# Patient Record
Sex: FEMALE | Race: WHITE | NOT HISPANIC OR LATINO | URBAN - METROPOLITAN AREA
[De-identification: names, ages, dates, MRNs, and addresses within clinical notes are randomized per-mention and may not be internally consistent; named-entity substitution may affect disease eponyms.]

---

## 2019-03-28 ENCOUNTER — APPOINTMENT (OUTPATIENT)
Age: 49
Setting detail: DERMATOLOGY
End: 2019-03-28

## 2019-03-28 VITALS
DIASTOLIC BLOOD PRESSURE: 89 MMHG | HEART RATE: 76 BPM | SYSTOLIC BLOOD PRESSURE: 146 MMHG | HEIGHT: 64 IN | WEIGHT: 175 LBS

## 2019-03-28 DIAGNOSIS — L71.8 OTHER ROSACEA: ICD-10-CM

## 2019-03-28 DIAGNOSIS — F17.200 NICOTINE DEPENDENCE, UNSPECIFIED, UNCOMPLICATED: ICD-10-CM

## 2019-03-28 DIAGNOSIS — L93.1 SUBACUTE CUTANEOUS LUPUS ERYTHEMATOSUS: ICD-10-CM

## 2019-03-28 PROBLEM — L30.9 DERMATITIS, UNSPECIFIED: Status: ACTIVE | Noted: 2019-03-28

## 2019-03-28 PROCEDURE — OTHER PRESCRIPTION: OTHER

## 2019-03-28 PROCEDURE — 11104 PUNCH BX SKIN SINGLE LESION: CPT

## 2019-03-28 PROCEDURE — OTHER BIOPSY BY PUNCH METHOD: OTHER

## 2019-03-28 PROCEDURE — OTHER PATIENT SPECIFIC COUNSELING: OTHER

## 2019-03-28 PROCEDURE — OTHER COUNSELING: OTHER

## 2019-03-28 PROCEDURE — OTHER MIPS QUALITY: OTHER

## 2019-03-28 PROCEDURE — 11105 PUNCH BX SKIN EA SEP/ADDL: CPT

## 2019-03-28 PROCEDURE — 99203 OFFICE O/P NEW LOW 30 MIN: CPT | Mod: 25

## 2019-03-28 RX ORDER — TRIAMCINOLONE ACETONIDE 1 MG/G
CREAM TOPICAL BID
Qty: 1 | Refills: 2 | Status: ERX | COMMUNITY
Start: 2019-03-28

## 2019-03-28 ASSESSMENT — LOCATION DETAILED DESCRIPTION DERM
LOCATION DETAILED: LEFT INFERIOR MEDIAL MALAR CHEEK
LOCATION DETAILED: RIGHT LATERAL SUPERIOR CHEST
LOCATION DETAILED: RIGHT MEDIAL SUPERIOR CHEST
LOCATION DETAILED: UPPER STERNUM
LOCATION DETAILED: RIGHT CENTRAL MALAR CHEEK
LOCATION DETAILED: SUPERIOR THORACIC SPINE

## 2019-03-28 ASSESSMENT — LOCATION ZONE DERM
LOCATION ZONE: FACE
LOCATION ZONE: TRUNK

## 2019-03-28 ASSESSMENT — LOCATION SIMPLE DESCRIPTION DERM
LOCATION SIMPLE: LEFT CHEEK
LOCATION SIMPLE: RIGHT CHEEK
LOCATION SIMPLE: UPPER BACK
LOCATION SIMPLE: CHEST

## 2019-03-28 ASSESSMENT — SEVERITY ASSESSMENT
SEVERITY: MODERATE
SEVERITY: MILD TO MODERATE

## 2019-03-28 ASSESSMENT — PAIN INTENSITY VAS: HOW INTENSE IS YOUR PAIN 0 BEING NO PAIN, 10 BEING THE MOST SEVERE PAIN POSSIBLE?: NO PAIN

## 2019-03-28 NOTE — PROCEDURE: PATIENT SPECIFIC COUNSELING
Explained etiology in detail with patient. Advised to avoid rosacea triggers including extreme heat or cold, stress, intake of alcohol, spicy foods, and caffeine. Use of topical or oral antibiotics may be used to aid in acne like bumps caused by rosacea, however facial redness and broken vessels requires cosmetic laser therapy treatment. The patient elects for no treatment at this time.
Explained the etiology of the condition in detail. Discussed treatment options including a topical steroid medications twice daily. Explained to the patient that with the previously prescribed prednisone that she is currently taking for her lupus flares should help with the rash as well. Explained to the patient in order to get a definitive diagnosis recommended a punch biopsy. The patient has expressed understanding and elects for a punch biopsy at this time.
Detail Level: Simple

## 2019-03-28 NOTE — HPI: RASH
What Type Of Note Output Would You Prefer (Optional)?: Standard Output
How Severe Is Your Rash?: mild
Is This A New Presentation, Or A Follow-Up?: Rash
Additional History: The patient had a surgery on December 27, 2018 and noticed the rash started a few days later. The patient started taking arava for rheumatoid arthritis and lupus’s few weeks prior to surgery, the patient discontinued medication three days ago.
Additional History: The patient is concerned about the broken capillaries on her face. Pain 0/10.

## 2019-03-28 NOTE — PROCEDURE: MIPS QUALITY
Quality 131: Pain Assessment And Follow-Up: Pain assessment using a standardized tool is documented as negative, no follow-up plan required
Detail Level: Zone
Quality 226: Preventive Care And Screening: Tobacco Use: Screening And Cessation Intervention: Patient screened for tobacco use, is a smoker AND received Cessation Counseling
Quality 265: Biopsy Follow-Up: Biopsy results reviewed, communicated, tracked, and documented

## 2019-03-28 NOTE — PROCEDURE: BIOPSY BY PUNCH METHOD
Hemostasis: None
Punch Size In Mm: 4
Notification Instructions: Patient will be notified of biopsy results. However, patient instructed to call the office if not contacted within 2 weeks.
Dressing: bandage
Wound Care: Petrolatum
Detail Level: Detailed
Anesthesia Type: 1% lidocaine without epinephrine and a 1:10 solution of 8.4% sodium bicarbonate
Patient Will Remove Sutures At Home?: No
Consent: Written consent was obtained and risks were reviewed including but not limited to scarring, infection, bleeding, scabbing, incomplete removal, nerve damage and allergy to anesthesia.
Biopsy Type: H and E
X Size Of Lesion In Cm (Optional): 0
Suture Removal: 14 days
Anesthesia Volume In Cc (Will Not Render If 0): 0.5
Epidermal Sutures: 4-0 Nylon
Post-Care Instructions: I reviewed with the patient in detail post-care instructions. Patient is to keep the biopsy site dry overnight, and then apply bacitracin twice daily until healed. Patient may apply hydrogen peroxide soaks to remove any crusting.
Billing Type: Third-Party Bill
Home Suture Removal Text: Patient was provided a home suture removal kit and will remove their sutures at home.  If they have any questions or difficulties they will call the office.
Biopsy Type: DIF
Was A Bandage Applied: Yes

## 2019-04-11 ENCOUNTER — APPOINTMENT (OUTPATIENT)
Age: 49
Setting detail: DERMATOLOGY
End: 2019-04-14

## 2019-04-11 VITALS
HEART RATE: 75 BPM | DIASTOLIC BLOOD PRESSURE: 84 MMHG | HEIGHT: 65 IN | WEIGHT: 170 LBS | SYSTOLIC BLOOD PRESSURE: 142 MMHG

## 2019-04-11 DIAGNOSIS — L27.0 GENERALIZED SKIN ERUPTION DUE TO DRUGS AND MEDICAMENTS TAKEN INTERNALLY: ICD-10-CM

## 2019-04-11 DIAGNOSIS — F17.200 NICOTINE DEPENDENCE, UNSPECIFIED, UNCOMPLICATED: ICD-10-CM

## 2019-04-11 PROCEDURE — OTHER PATIENT SPECIFIC COUNSELING: OTHER

## 2019-04-11 PROCEDURE — 99213 OFFICE O/P EST LOW 20 MIN: CPT

## 2019-04-11 PROCEDURE — OTHER MIPS QUALITY: OTHER

## 2019-04-11 PROCEDURE — OTHER COUNSELING: OTHER

## 2019-04-11 PROCEDURE — OTHER PATHOLOGY DISCUSSION: OTHER

## 2019-04-11 ASSESSMENT — PAIN INTENSITY VAS: HOW INTENSE IS YOUR PAIN 0 BEING NO PAIN, 10 BEING THE MOST SEVERE PAIN POSSIBLE?: NO PAIN

## 2019-04-11 ASSESSMENT — LOCATION ZONE DERM: LOCATION ZONE: TRUNK

## 2019-04-11 ASSESSMENT — LOCATION DETAILED DESCRIPTION DERM
LOCATION DETAILED: MIDDLE STERNUM
LOCATION DETAILED: SUPERIOR THORACIC SPINE

## 2019-04-11 ASSESSMENT — LOCATION SIMPLE DESCRIPTION DERM
LOCATION SIMPLE: UPPER BACK
LOCATION SIMPLE: CHEST

## 2019-04-11 ASSESSMENT — SEVERITY ASSESSMENT: SEVERITY: MILD

## 2019-04-11 NOTE — PROCEDURE: PATIENT SPECIFIC COUNSELING
Detail Level: Simple
Reassured the patient that condition has improved since her previous visit. Discussed pathology results with the patient in detail including that pathology indicates condition is likely due to medication reaction. Reassured the patient that condition is resolving with treatment regimen and discontinuation of most recently started medication. Instructed the patient to continue use of triamcinolone 0.1% cream twice daily as needed for itch. The patient will follow up as needed.

## 2019-04-11 NOTE — PROCEDURE: MIPS QUALITY
Quality 226: Preventive Care And Screening: Tobacco Use: Screening And Cessation Intervention: Patient screened for tobacco use, is a smoker AND received Cessation Counseling
Detail Level: Zone
Quality 131: Pain Assessment And Follow-Up: Pain assessment using a standardized tool is documented as negative, no follow-up plan required

## 2019-08-01 ENCOUNTER — APPOINTMENT (OUTPATIENT)
Dept: URBAN - METROPOLITAN AREA CLINIC 286 | Age: 49
Setting detail: DERMATOLOGY
End: 2019-08-04

## 2019-08-01 VITALS
HEIGHT: 64 IN | WEIGHT: 170 LBS | DIASTOLIC BLOOD PRESSURE: 78 MMHG | SYSTOLIC BLOOD PRESSURE: 135 MMHG | HEART RATE: 73 BPM

## 2019-08-01 DIAGNOSIS — L93.1 SUBACUTE CUTANEOUS LUPUS ERYTHEMATOSUS: ICD-10-CM

## 2019-08-01 PROBLEM — L30.9 DERMATITIS, UNSPECIFIED: Status: ACTIVE | Noted: 2019-08-01

## 2019-08-01 PROCEDURE — OTHER PATIENT SPECIFIC COUNSELING: OTHER

## 2019-08-01 PROCEDURE — 99213 OFFICE O/P EST LOW 20 MIN: CPT

## 2019-08-01 PROCEDURE — OTHER PRESCRIPTION: OTHER

## 2019-08-01 PROCEDURE — OTHER COUNSELING: OTHER

## 2019-08-01 PROCEDURE — OTHER MIPS QUALITY: OTHER

## 2019-08-01 RX ORDER — FLUOCINONIDE 0.5 MG/G
CREAM TOPICAL
Qty: 1 | Refills: 3 | Status: ERX | COMMUNITY
Start: 2019-08-01

## 2019-08-01 ASSESSMENT — LOCATION SIMPLE DESCRIPTION DERM: LOCATION SIMPLE: CHEST

## 2019-08-01 ASSESSMENT — LOCATION DETAILED DESCRIPTION DERM
LOCATION DETAILED: LEFT MEDIAL SUPERIOR CHEST
LOCATION DETAILED: RIGHT MEDIAL SUPERIOR CHEST

## 2019-08-01 ASSESSMENT — LOCATION ZONE DERM: LOCATION ZONE: TRUNK

## 2019-08-01 ASSESSMENT — SEVERITY ASSESSMENT: SEVERITY: MODERATE

## 2019-08-01 ASSESSMENT — PAIN INTENSITY VAS: HOW INTENSE IS YOUR PAIN 0 BEING NO PAIN, 10 BEING THE MOST SEVERE PAIN POSSIBLE?: NO PAIN

## 2019-08-01 NOTE — HPI: RASH
What Type Of Note Output Would You Prefer (Optional)?: Standard Output
How Severe Is Your Rash?: mild
Is This A New Presentation, Or A Follow-Up?: Rash
Additional History: The patient was evaluated for a rash on March 28, 2019. The patient has lupus and rheumatoid arthritis and at that time the patient was taking Arava. The patient had discontinued Arava prior to being seen and was taking prednisone for rheumatoid arthritis. There was a punch biopsy preformed at that visit that showed morbilliform drug eruption. The patient was prescribed triamcinolone 0.1% topical cream. The patient again evaluated on April 11, 2019 and at that time the rash was resolving. After that appointment the patient was prescribed Orencia for lupus and rheumatoid arthritis. The patient states that this new rash had appeared in June 2019. The patient states that this rash is not the same as the prior rash. The patient states that her rheumatologist believes that this rash can be caused from lupus. The patient is not currently using any medication to treat the rash. Pain 0/10.

## 2019-08-01 NOTE — PROCEDURE: PATIENT SPECIFIC COUNSELING
Detail Level: Simple
Explained to the patient that this rash may be caused from lupus. Discussed treatment options including a biopsy to get a definitive diagnosis or treating the rash with topical steroids. The patient elects to treat the rash with topical steroids at this time. Advised the patient to avoid sunlight exposure as this will exacerbate the condition should this condition be caused from lupus. Should the condition worsen a biopsy may be preformed at the patient’s next visit. The patient has expressed understanding and is agreeable with the treatment regimen. The patient will follow up in two weeks.

## 2019-08-14 ENCOUNTER — APPOINTMENT (OUTPATIENT)
Dept: URBAN - METROPOLITAN AREA CLINIC 286 | Age: 49
Setting detail: DERMATOLOGY
End: 2019-08-17

## 2019-08-14 VITALS
HEIGHT: 64 IN | DIASTOLIC BLOOD PRESSURE: 89 MMHG | HEART RATE: 97 BPM | WEIGHT: 148 LBS | SYSTOLIC BLOOD PRESSURE: 148 MMHG

## 2019-08-14 DIAGNOSIS — F17.200 NICOTINE DEPENDENCE, UNSPECIFIED, UNCOMPLICATED: ICD-10-CM

## 2019-08-14 DIAGNOSIS — L93.1 SUBACUTE CUTANEOUS LUPUS ERYTHEMATOSUS: ICD-10-CM

## 2019-08-14 PROCEDURE — OTHER TREATMENT REGIMEN: OTHER

## 2019-08-14 PROCEDURE — OTHER MIPS QUALITY: OTHER

## 2019-08-14 PROCEDURE — OTHER COUNSELING: OTHER

## 2019-08-14 PROCEDURE — 99213 OFFICE O/P EST LOW 20 MIN: CPT

## 2019-08-14 PROCEDURE — OTHER PATIENT SPECIFIC COUNSELING: OTHER

## 2019-08-14 ASSESSMENT — SEVERITY ASSESSMENT: SEVERITY: MILD

## 2019-08-14 ASSESSMENT — LOCATION DETAILED DESCRIPTION DERM: LOCATION DETAILED: MIDDLE STERNUM

## 2019-08-14 ASSESSMENT — BSA RASH: BSA RASH: 2

## 2019-08-14 ASSESSMENT — PAIN INTENSITY VAS: HOW INTENSE IS YOUR PAIN 0 BEING NO PAIN, 10 BEING THE MOST SEVERE PAIN POSSIBLE?: NO PAIN

## 2019-08-14 ASSESSMENT — LOCATION SIMPLE DESCRIPTION DERM: LOCATION SIMPLE: CHEST

## 2019-08-14 ASSESSMENT — LOCATION ZONE DERM: LOCATION ZONE: TRUNK

## 2019-08-14 NOTE — PROCEDURE: TREATMENT REGIMEN
Otc Regimen: Sunscreen daily
Continue Regimen: Fluocinonide 0.05% cream twice daily as needed
Detail Level: Zone

## 2019-08-14 NOTE — PROCEDURE: MIPS QUALITY
Quality 131: Pain Assessment And Follow-Up: Pain assessment using a standardized tool is documented as negative, no follow-up plan required
Detail Level: Zone
Quality 226: Preventive Care And Screening: Tobacco Use: Screening And Cessation Intervention: Patient screened for tobacco use, is a smoker AND received Cessation Counseling

## 2020-04-13 NOTE — PROCEDURE: PATIENT SPECIFIC COUNSELING
Reassured the patient that condition is significantly improved with use of topical fluocinonide 0.05% cream twice daily. Instructed the patient to keep chest area out of the sun as this is likely to exacerbate condition. Due to improvement, advised that the patient continue use of fluocinonide as needed with flares. The patient is agreeable with treatment plan and will follow up for a one year skin exam.
Detail Level: Simple
Yes

## 2023-08-11 ENCOUNTER — APPOINTMENT (OUTPATIENT)
Dept: URBAN - METROPOLITAN AREA CLINIC 232 | Age: 53
Setting detail: DERMATOLOGY
End: 2023-08-12

## 2023-08-11 DIAGNOSIS — R21 RASH AND OTHER NONSPECIFIC SKIN ERUPTION: ICD-10-CM

## 2023-08-11 PROCEDURE — OTHER PHOTO-DOCUMENTATION: OTHER

## 2023-08-11 PROCEDURE — OTHER PRESCRIPTION: OTHER

## 2023-08-11 PROCEDURE — OTHER MEDICATION COUNSELING: OTHER

## 2023-08-11 PROCEDURE — 11104 PUNCH BX SKIN SINGLE LESION: CPT

## 2023-08-11 PROCEDURE — OTHER TREATMENT REGIMEN: OTHER

## 2023-08-11 PROCEDURE — OTHER COUNSELING: OTHER

## 2023-08-11 PROCEDURE — OTHER BIOPSY BY PUNCH METHOD: OTHER

## 2023-08-11 RX ORDER — CLOBETASOL PROPIONATE 0.5 MG/G
CREAM TOPICAL BID
Qty: 60 | Refills: 3 | Status: ERX | COMMUNITY
Start: 2023-08-11

## 2023-08-11 RX ORDER — PREDNISONE 10 MG/1
TABLET ORAL
Qty: 30 | Refills: 0 | Status: ERX | COMMUNITY
Start: 2023-08-11

## 2023-08-11 ASSESSMENT — SEVERITY ASSESSMENT: SEVERITY: MODERATE

## 2023-08-11 ASSESSMENT — BSA RASH: BSA RASH: 40

## 2023-08-11 ASSESSMENT — LOCATION DETAILED DESCRIPTION DERM: LOCATION DETAILED: LEFT SUPERIOR UPPER BACK

## 2023-08-11 ASSESSMENT — LOCATION SIMPLE DESCRIPTION DERM: LOCATION SIMPLE: LEFT UPPER BACK

## 2023-08-11 ASSESSMENT — PAIN INTENSITY VAS: HOW INTENSE IS YOUR PAIN 0 BEING NO PAIN, 10 BEING THE MOST SEVERE PAIN POSSIBLE?: NO PAIN

## 2023-08-11 ASSESSMENT — LOCATION ZONE DERM: LOCATION ZONE: TRUNK

## 2023-08-11 NOTE — PROCEDURE: COUNSELING
Patient Specific Counseling (Will Not Stick From Patient To Patient): The rash is suspicious for a drug reaction given that this is the 2nd occurrence while on leflunomide. 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 She notes she has had rapid improvement of cutaneous disease in the past with a steroid taper, and though she did receive prednisone for this episode, it was 5 days at a lower than usual dose. This will be prescribed in addition to a potent TCS to treat while waiting on the Bx result.
Detail Level: Detailed

## 2023-08-11 NOTE — PROCEDURE: BIOPSY BY PUNCH METHOD
Detail Level: Detailed
Was A Bandage Applied: Yes
Punch Size In Mm: 5
Size Of Lesion In Cm (Optional): 0
Depth Of Punch Biopsy: dermis
Biopsy Type: H and E
Anesthesia Type: 1% lidocaine with epinephrine and a 1:10 solution of 8.4% sodium bicarbonate
Anesthesia Volume In Cc (Will Not Render If 0): 0.5
Hemostasis: None
Epidermal Sutures: 4-0 Nylon
Wound Care: Petrolatum
Dressing: bandage
Suture Removal: 14 days
Patient Will Remove Sutures At Home?: No
Lab: [id number]
Consent: Written consent was obtained and risks were reviewed including but not limited to scarring, infection, bleeding, scabbing, incomplete removal, nerve damage and allergy to anesthesia.
Post-Care Instructions: I reviewed with the patient in detail post-care instructions. Patient is to keep the biopsy site dry overnight, and then apply bacitracin twice daily until healed. Patient may apply hydrogen peroxide soaks to remove any crusting.
Home Suture Removal Text: Patient was provided a home suture removal kit and will remove their sutures at home.  If they have any questions or difficulties they will call the office.
Notification Instructions: Patient will be notified of biopsy results. However, patient instructed to call the office if not contacted within 2 weeks.
Billing Type: Third-Party Bill
Information: Selecting Yes will display possible errors in your note based on the variables you have selected. This validation is only offered as a suggestion for you. PLEASE NOTE THAT THE VALIDATION TEXT WILL BE REMOVED WHEN YOU FINALIZE YOUR NOTE. IF YOU WANT TO FAX A PRELIMINARY NOTE YOU WILL NEED TO TOGGLE THIS TO 'NO' IF YOU DO NOT WANT IT IN YOUR FAXED NOTE.

## 2023-08-11 NOTE — PROCEDURE: MEDICATION COUNSELING
----- Message from NANCY Gomes High Dose Vitamin A Counseling: Side effects reviewed, pt to contact office should one occur.

## 2023-08-11 NOTE — PROCEDURE: MEDICATION COUNSELING
Xelmamiz Pregnancy And Lactation Text: This medication is Pregnancy Category D and is not considered safe during pregnancy.  The risk during breast feeding is also uncertain.

## 2023-08-25 ENCOUNTER — APPOINTMENT (OUTPATIENT)
Dept: URBAN - METROPOLITAN AREA CLINIC 232 | Age: 53
Setting detail: DERMATOLOGY
End: 2023-08-26

## 2023-08-25 DIAGNOSIS — Z48.02 ENCOUNTER FOR REMOVAL OF SUTURES: ICD-10-CM

## 2023-08-25 DIAGNOSIS — L93.1 SUBACUTE CUTANEOUS LUPUS ERYTHEMATOSUS: ICD-10-CM

## 2023-08-25 PROCEDURE — OTHER SUTURE REMOVAL (NO GLOBAL PERIOD): OTHER

## 2023-08-25 PROCEDURE — OTHER MIPS QUALITY: OTHER

## 2023-08-25 PROCEDURE — OTHER TREATMENT REGIMEN: OTHER

## 2023-08-25 PROCEDURE — OTHER COUNSELING: OTHER

## 2023-08-25 PROCEDURE — OTHER PRESCRIPTION: OTHER

## 2023-08-25 PROCEDURE — 99213 OFFICE O/P EST LOW 20 MIN: CPT

## 2023-08-25 PROCEDURE — OTHER PHOTO-DOCUMENTATION: OTHER

## 2023-08-25 RX ORDER — TACROLIMUS 1 MG/G
OINTMENT TOPICAL
Qty: 100 | Refills: 3 | Status: ERX | COMMUNITY
Start: 2023-08-25

## 2023-08-25 ASSESSMENT — LOCATION ZONE DERM: LOCATION ZONE: TRUNK

## 2023-08-25 ASSESSMENT — BSA RASH: BSA RASH: 2

## 2023-08-25 ASSESSMENT — LOCATION DETAILED DESCRIPTION DERM: LOCATION DETAILED: LEFT SUPERIOR UPPER BACK

## 2023-08-25 ASSESSMENT — LOCATION SIMPLE DESCRIPTION DERM: LOCATION SIMPLE: LEFT UPPER BACK

## 2023-08-25 ASSESSMENT — SEVERITY ASSESSMENT: SEVERITY: ALMOST CLEAR

## 2023-08-25 NOTE — PROCEDURE: TREATMENT REGIMEN
Plan: Discussed patient's pathology in detail with her today. 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  Patient is to follow up with her rheumatologist Dr. Jessica Velez in September at her next sched appointment, and I will send her a letter with our findings and treatment course since her last rheumatology visit. Patient to discontinue Clobetasol in the next week and will start tacrolimus for ongoing maintenance, eventually tapering off as able.
Initiate Treatment: tacrolimus 0.1 % topical ointment, Apply twice daily to affected areas on trunk and extremities as she transitions off of clobetasol
Detail Level: Zone
Continue Regimen: clobetasol 0.05 % topical cream, Apply twice daily to rash for another week